# Patient Record
Sex: MALE | Race: WHITE | NOT HISPANIC OR LATINO | Employment: OTHER | URBAN - METROPOLITAN AREA
[De-identification: names, ages, dates, MRNs, and addresses within clinical notes are randomized per-mention and may not be internally consistent; named-entity substitution may affect disease eponyms.]

---

## 2024-08-12 DIAGNOSIS — N32.0 BLADDER NECK CONTRACTURE: Primary | ICD-10-CM

## 2024-08-14 ENCOUNTER — PRE-ADMISSION TESTING (OUTPATIENT)
Dept: PREADMISSION TESTING | Facility: HOSPITAL | Age: 79
End: 2024-08-14
Payer: MEDICARE

## 2024-08-14 VITALS
BODY MASS INDEX: 33.5 KG/M2 | HEART RATE: 64 BPM | RESPIRATION RATE: 16 BRPM | TEMPERATURE: 97.2 F | DIASTOLIC BLOOD PRESSURE: 72 MMHG | OXYGEN SATURATION: 97 % | WEIGHT: 247.36 LBS | SYSTOLIC BLOOD PRESSURE: 146 MMHG | HEIGHT: 72 IN

## 2024-08-14 DIAGNOSIS — R79.1 ABNORMAL COAGULATION PROFILE: ICD-10-CM

## 2024-08-14 DIAGNOSIS — Z01.818 PRE-OP TESTING: ICD-10-CM

## 2024-08-14 LAB
ANION GAP SERPL CALC-SCNC: 13 MMOL/L (ref 10–20)
APTT PPP: 32 SECONDS (ref 27–38)
BUN SERPL-MCNC: 17 MG/DL (ref 6–23)
CALCIUM SERPL-MCNC: 9 MG/DL (ref 8.6–10.3)
CHLORIDE SERPL-SCNC: 108 MMOL/L (ref 98–107)
CO2 SERPL-SCNC: 28 MMOL/L (ref 21–32)
CREAT SERPL-MCNC: 1.04 MG/DL (ref 0.5–1.3)
EGFRCR SERPLBLD CKD-EPI 2021: 73 ML/MIN/1.73M*2
ERYTHROCYTE [DISTWIDTH] IN BLOOD BY AUTOMATED COUNT: 13.3 % (ref 11.5–14.5)
GLUCOSE SERPL-MCNC: 116 MG/DL (ref 74–99)
HCT VFR BLD AUTO: 41.3 % (ref 41–52)
HGB BLD-MCNC: 14.2 G/DL (ref 13.5–17.5)
INR PPP: 1.1 (ref 0.9–1.1)
MCH RBC QN AUTO: 32.2 PG (ref 26–34)
MCHC RBC AUTO-ENTMCNC: 34.4 G/DL (ref 32–36)
MCV RBC AUTO: 94 FL (ref 80–100)
NRBC BLD-RTO: 0 /100 WBCS (ref 0–0)
PLATELET # BLD AUTO: 174 X10*3/UL (ref 150–450)
POTASSIUM SERPL-SCNC: 3.3 MMOL/L (ref 3.5–5.3)
PROTHROMBIN TIME: 12.4 SECONDS (ref 9.8–12.8)
RBC # BLD AUTO: 4.41 X10*6/UL (ref 4.5–5.9)
SODIUM SERPL-SCNC: 146 MMOL/L (ref 136–145)
WBC # BLD AUTO: 5.6 X10*3/UL (ref 4.4–11.3)

## 2024-08-14 PROCEDURE — 80048 BASIC METABOLIC PNL TOTAL CA: CPT

## 2024-08-14 PROCEDURE — 85610 PROTHROMBIN TIME: CPT

## 2024-08-14 PROCEDURE — 99203 OFFICE O/P NEW LOW 30 MIN: CPT | Performed by: NURSE PRACTITIONER

## 2024-08-14 PROCEDURE — 85027 COMPLETE CBC AUTOMATED: CPT

## 2024-08-14 PROCEDURE — 93005 ELECTROCARDIOGRAM TRACING: CPT

## 2024-08-14 PROCEDURE — 36415 COLL VENOUS BLD VENIPUNCTURE: CPT

## 2024-08-14 PROCEDURE — 93010 ELECTROCARDIOGRAM REPORT: CPT | Performed by: INTERNAL MEDICINE

## 2024-08-14 RX ORDER — CARBOXYMETHYLCELLULOSE SODIUM 5 MG/ML
1 SOLUTION/ DROPS OPHTHALMIC AS NEEDED
COMMUNITY

## 2024-08-14 RX ORDER — CALCIUM CARB/VITAMIN D3/VIT K1 500-500-40
TABLET,CHEWABLE ORAL DAILY
COMMUNITY

## 2024-08-14 RX ORDER — LANOLIN ALCOHOL/MO/W.PET/CERES
500 CREAM (GRAM) TOPICAL NIGHTLY
COMMUNITY

## 2024-08-14 RX ORDER — ROSUVASTATIN CALCIUM 10 MG/1
10 TABLET, COATED ORAL NIGHTLY
COMMUNITY

## 2024-08-14 RX ORDER — AMLODIPINE BESYLATE 10 MG/1
TABLET ORAL
COMMUNITY

## 2024-08-14 RX ORDER — ASPIRIN 325 MG
TABLET, DELAYED RELEASE (ENTERIC COATED) ORAL DAILY
COMMUNITY

## 2024-08-14 RX ORDER — OMEPRAZOLE 20 MG/1
20 TABLET, DELAYED RELEASE ORAL
COMMUNITY

## 2024-08-14 RX ORDER — BISMUTH SUBSALICYLATE 262 MG
1 TABLET,CHEWABLE ORAL
COMMUNITY

## 2024-08-14 RX ORDER — VITAMIN E MIXED 400 UNIT
CAPSULE ORAL DAILY
COMMUNITY

## 2024-08-14 RX ORDER — LEVOTHYROXINE SODIUM 100 UG/1
100 TABLET ORAL NIGHTLY
COMMUNITY

## 2024-08-14 RX ORDER — BENAZEPRIL HYDROCHLORIDE 5 MG/1
5 TABLET ORAL NIGHTLY
COMMUNITY

## 2024-08-14 RX ORDER — BUDESONIDE AND FORMOTEROL FUMARATE DIHYDRATE 80; 4.5 UG/1; UG/1
2 AEROSOL RESPIRATORY (INHALATION)
COMMUNITY

## 2024-08-14 RX ORDER — ATENOLOL 50 MG/1
TABLET ORAL
COMMUNITY

## 2024-08-14 RX ORDER — HYDROCHLOROTHIAZIDE 12.5 MG/1
CAPSULE ORAL EVERY MORNING
COMMUNITY

## 2024-08-14 RX ORDER — ALBUTEROL SULFATE 90 UG/1
2 INHALANT RESPIRATORY (INHALATION)
COMMUNITY

## 2024-08-14 ASSESSMENT — DUKE ACTIVITY SCORE INDEX (DASI)
CAN YOU RUN A SHORT DISTANCE: NO
CAN YOU WALK INDOORS, SUCH AS AROUND YOUR HOUSE: YES
CAN YOU WALK A BLOCK OR TWO ON LEVEL GROUND: YES
CAN YOU PARTICIPATE IN STRENOUS SPORTS LIKE SWIMMING, SINGLES TENNIS, FOOTBALL, BASKETBALL, OR SKIING: NO
CAN YOU DO HEAVY WORK AROUND THE HOUSE LIKE SCRUBBING FLOORS OR LIFTING AND MOVING HEAVY FURNITURE: NO
CAN YOU TAKE CARE OF YOURSELF (EAT, DRESS, BATHE, OR USE TOILET): YES
CAN YOU DO LIGHT WORK AROUND THE HOUSE LIKE DUSTING OR WASHING DISHES: YES
CAN YOU DO YARD WORK LIKE RAKING LEAVES, WEEDING OR PUSHING A MOWER: NO
CAN YOU PARTICIPATE IN MODERATE RECREATIONAL ACTIVITIES LIKE GOLF, BOWLING, DANCING, DOUBLES TENNIS OR THROWING A BASEBALL OR FOOTBALL: NO
CAN YOU HAVE SEXUAL RELATIONS: YES
CAN YOU DO MODERATE WORK AROUND THE HOUSE LIKE VACUUMING, SWEEPING FLOORS OR CARRYING GROCERIES: YES

## 2024-08-14 NOTE — CPM/PAT H&P
"CPM/PAT Evaluation       Name: Walt Don (Wlat Don \"Richard\")  /Age: 1945/79 y.o.     In-Person       Chief Complaint:     79 yr old male with urinary difficulties.    He shares that he was DX with prostate cancer   and tx with brachytherapy--> PSA elevated and followed by cryotherapy in   He c/o urination difficulties for years since cryotherapy    He states he presented concerns to Dr. Ash and states he had an ablation in the past for similar issues    He c/o a chronic weak stream that he treats by \"maneuvers his bladder to urinate, takes quite a while  by pulling up on his stomach and stands straighter that helps start the stream.      He c/o urinary frequency  and will have a \"tremendous urgency to pee and pushes or grunts  it out.\" He c/o hesitancy and has difficulty emptying bladder  He c/o leakage of urine, with urge/bending over and also is not always with awareness ---->states \"cannot feel it\" and wears up to 5-6 pads per /day  + enuresis and + nocturia, every 2 hours.    He c/o pain and burning with urination for the last week or so     Denies any bloody urine   Denies any cloudy or foul smelling urine.    Denies any cardiac or respiratory symptoms.  Denies any past issues with anesthesia.      CYSTOSCOPY/ TRANSURETHRAL INCISION/ TRANSURETHRAL RESECTION OF BLADDER NECK CONTRACTURE  is scheduled on 2024 with Dr. Ash      Past Medical History:   Diagnosis Date    Arthritis     Hyperlipidemia     Hypertension     Hypothyroidism     Pancreatic cancer (Multi)     PE (pulmonary thromboembolism) (Multi)     with COVID pneumonia        Acute pulmonary embolism (HCC) 2023    COVID      Deep vein thrombosis (DVT) of lower extremity (HCC) 2023    Gout 2020    Hyperlipidemia 2020    Hypertension      Legionella infection (HCC)      Lumbar spondylosis 2023    Prostate cancer (HCC)        Past Surgical History:   Procedure Laterality Date    " "CATARACT EXTRACTION      COLONOSCOPY      MENISCECTOMY      ROTATOR CUFF REPAIR           ORTHOPEDICS SURGERY HX          bilat. knees    PAST SURGICAL HISTORY OF         B/L rotatar cuff       Patient  has no history on file for sexual activity.    No family history on file.    Allergies   Allergen Reactions    Ciprofloxacin Other     Patient clarifies - not truly allergic, rather - this medication does \"absolutely nothing\" for him       Prior to Admission medications    Medication Sig Start Date End Date Taking? Authorizing Provider   amLODIPine (Norvasc) 10 mg tablet Take by mouth.   Yes Historical Provider, MD   ascorbic acid (Vitamin C) 500 mg ER capsule Take 1 capsule (500 mg) by mouth once daily.   Yes Historical Provider, MD   atenolol (Tenormin) 50 mg tablet Take by mouth.   Yes Historical Provider, MD   benazepril (Lotensin) 5 mg tablet Take 1 tablet (5 mg) by mouth once daily.   Yes Historical Provider, MD   carboxymethylcellulose (Refresh Plus) 0.5 % ophthalmic solution 1 drop if needed for dry eyes.   Yes Historical Provider, MD   cholecalciferol (Vitamin D-3) 400 unit capsule Take by mouth once daily.   Yes Historical Provider, MD   hydroCHLOROthiazide (Microzide) 12.5 mg capsule Take by mouth once daily in the morning.   Yes Historical Provider, MD   levothyroxine (Synthroid, Levoxyl) 100 mcg tablet Take 1 tablet (100 mcg) by mouth. Take on an empty stomach at the same time each day, either 30 to 60 minutes prior to breakfast   Yes Historical Provider, MD   multivitamin tablet Take 1 tablet by mouth.   Yes Historical Provider, MD   omeprazole OTC (PriLOSEC OTC) 20 mg EC tablet Take 1 tablet (20 mg) by mouth. Do not crush, chew, or split.   Yes Historical Provider, MD   rosuvastatin (Crestor) 10 mg tablet Take 1 tablet (10 mg) by mouth.   Yes Historical Provider, MD   albuterol 90 mcg/actuation inhaler Inhale 2 puffs.    Historical Provider, MD   budesonide-formoteroL (Symbicort) 80-4.5 mcg/actuation " inhaler Inhale 2 puffs 2 times a day. Rinse mouth with water after use to reduce aftertaste and incidence of candidiasis. Do not swallow.    Historical Provider, MD   omega-3 (Fish OiL) 60- mg capsule Take by mouth once daily.    Historical Provider, MD      Review of Systems  Constitutional: NO F, chills, or sweats  Eyes: no blurred vision or visual disturbance  ENT: denies congestion, sore throat, difficulty hearing  Cardiovascular: RAMIREZ, no chest pain, no edema, no palps and no syncope.   Respiratory: no cough,no s.o.b. and no wheezing  Gastrointestinal: c/o constipation, last BM 2 days ago, no abdominal pain, no N/V, no blood in stools  Genitourinary:  see HPI  Musculoskeletal: no arthralgias,  no back pain and no myalgias.   Integumentary: no new skin lesions and no rashes.   Neurological: no difficulty walking, no headache, no limb weakness, no numbness and no tingling.   Endocrine: no recent weight gain and no recent weight loss.   Hematologic/Lymphatic: + tendency for easy bruising and no swollen glands.      Physical Exam  Constitutional:       Appearance: Normal appearance.   Cardiovascular:      Rate and Rhythm: Normal rate.      Heart sounds: Normal heart sounds.   Pulmonary:      Effort: Pulmonary effort is normal.      Breath sounds: Normal breath sounds.   Abdominal:      General: Bowel sounds are normal.      Palpations: Abdomen is soft.   Musculoskeletal:         General: Normal range of motion.      Cervical back: Normal range of motion.      Right lower leg: No edema.      Left lower leg: No edema.   Skin:     General: Skin is warm and dry.   Neurological:      Mental Status: He is alert and oriented to person, place, and time.          PAT AIRWAY:   Airway:     Mallampati::  II    TM distance::  >3 FB    Neck ROM::  Full   implants and upper dentures      Visit Vitals  /72   Pulse 64   Temp 36.2 °C (97.2 °F) (Temporal)   Resp 16       DASI Risk Score    No data to display       Caprini  DVT Assessment    No data to display       Modified Frailty Index    No data to display       CHADS2 Stroke Risk  Current as of 42 minutes ago        N/A 3 to 100%: High Risk   2 to < 3%: Medium Risk   0 to < 2%: Low Risk     Last Change: N/A          This score determines the patient's risk of having a stroke if the patient has atrial fibrillation.        This score is not applicable to this patient. Components are not calculated.          Revised Cardiac Risk Index    No data to display       Apfel Simplified Score    No data to display       Risk Analysis Index Results This Encounter    No data found in the last 1 encounters.       Stop Bang Score      Flowsheet Row Most Recent Value   Do you snore loudly? 1   Do you often feel tired or fatigued after your sleep? 0   Has anyone ever observed you stop breathing in your sleep? 0   Do you have or are you being treated for high blood pressure? 1   Recent BMI (Calculated) 0   Age older than 50 years old? 1=Yes   Is your neck circumference greater than 17 inches (Male) or 16 inches (Female)? 0   Gender - Male 1=Yes            ASSESSMENT  Obesity BMI 33.55      HLD/HTN  Takes Atenolol, amlodipine, benzpril, fish oil, hydrochlorothiazide, Rosuvastatin  ECG 8/14/2024- Sinus rhythm 1st degree AVB, 67 bpm      COVID pneumonia  Follows pulmonology  Has inhalers PRN    CXR/2 view- 8/13/2023  IMPRESSION:   No acute cardiopulmonary process.        ANESTHESIA FINDINGS:  Intubation History: No history of difficult intubation  Significant Anesthesia Considerations:      Airway History: No abnormal airway history  Predictors of Difficult Airway Management  ? Obesity         PLAN  This patient is optimally prepared for surgery.     CONSULTS:    Patient does not require consults for optimization at this time.     The Following Tests/Procedures Have Been Initiated:  CBC, BMP, Coag screen,  ECG     Planned Anesthetic: general     Instructions Given to Patient:  Patient given verbal and  written preop instructions and voices comprehension and compliance.     No further testing required.

## 2024-08-14 NOTE — PREPROCEDURE INSTRUCTIONS
Medication List            Accurate as of August 14, 2024  2:28 PM. Always use your most recent med list.                albuterol 90 mcg/actuation inhaler  Medication Adjustments for Surgery: Other (Comment)     amLODIPine 10 mg tablet  Commonly known as: Norvasc  Medication Adjustments for Surgery: Take morning of surgery with sip of water, no other fluids     ascorbic acid 500 mg ER capsule  Commonly known as: Vitamin C  Medication Adjustments for Surgery: Stop 7 days before surgery     atenolol 50 mg tablet  Commonly known as: Tenormin  Medication Adjustments for Surgery: Take morning of surgery with sip of water, no other fluids     benazepril 5 mg tablet  Commonly known as: Lotensin  Medication Adjustments for Surgery: Stop 1 day before surgery  Notes to patient: DO not take the night before surgery, 8/18     budesonide-formoteroL 80-4.5 mcg/actuation inhaler  Commonly known as: Symbicort     carboxymethylcellulose 0.5 % ophthalmic solution  Commonly known as: Refresh Plus     cholecalciferol 400 unit capsule  Commonly known as: Vitamin D-3     Fish OiL 60- mg capsule  Generic drug: omega-3     hydroCHLOROthiazide 12.5 mg capsule  Commonly known as: Microzide     levothyroxine 100 mcg tablet  Commonly known as: Synthroid, Levoxyl     multivitamin tablet     omeprazole OTC 20 mg EC tablet  Commonly known as: PriLOSEC OTC     rosuvastatin 10 mg tablet  Commonly known as: Crestor     vitamin E 450 mg (1000 unit) capsule            PRE-OPERATIVE INSTRUCTIONS FOR SURGERY    *Do not eat anything after midnight the night of surgery.  This includes food of any kind (including hard candy, cough drops, mints).   You may have up to 13.5 ounces of clear liquid  until TWO hours prior to your arrival time to the hospital.  This includes water, black tea/coffee, (no milk or cream) apple juice and electrolyte drinks (GATORADE).  You may chew gum until TWO hours prior you your surgery/procedure.     PLEASE REVIEW YOUR  MEDICATION LIST so as to identify which medications should be stopped/held prior to your procedure, as well what medications must be taken the day of your surgery with sips of clear liquid.      -------------------------------------------------      *One of our staff members will call you ONE business day before your surgery, between 11am-2 pm to let you know the time to arrive  .   If you have not received a call by 2 pm, call [phone]    *When you arrive at the hospital-->GO TO Registration on the ground floor    *Stop smoking 24 hours prior to surgery.      No Marijuana, CBD Oil or Vaping for 48  Hours    *No alcohol 24 hours prior to surgery    *You will need a responsible adult to drive you home    -No acrylic nails or nail polish on at least one fingernail, NO polish on toes for foot surgery    -You may be asked to remove your dentures, partial plate, eyeglasses or contact lenses before going to surgery.  Please bring a case for these items.    Take a shower the morning of surgery so as to remove all body residue from applied lotions, powders, cologne, or deodorant.  DO NOT REAPPLY ANY OF THESE ITEMS AFTER BATHING.    -Body piercings need to be removed.  Jewelry and valuables should be left at home.    -Put on loose,  comfortable, clean clothing, that will accommodate bandages    -------------------------------------------------      What you may be asked to bring to surgery:  ___PHOTO ID and insurance information      -------------------------------------------------  NPO Instructions:    Do not eat any food after midnight the night before your surgery/procedure.  You may have clear liquids until TWO hours before surgery/procedure. This includes water, black tea/coffee, (no milk or cream) apple juice and electrolyte drinks (Gatorade).  You may chew gum up to TWO hours before your surgery/procedure.    Additional Instructions:       Day of Surgery:  Review your medication instructions, take indicated  medications  You may have clear liquids until TWO hours before surgery/procedure.  This includes water, black tea/coffee, (no milk or cream) apple juice and electrolyte drinks (Gatorade)  You may chew gum up to TWO hours before your surgery/procedure  Wear  comfortable loose fitting clothing  Do not use moisturizers, creams, lotions or perfume  All jewelry and valuables should be left at home      ------------------------------------------------

## 2024-08-14 NOTE — H&P (VIEW-ONLY)
"CPM/PAT Evaluation       Name: Walt Don (Walt Don \"Richard\")  /Age: 1945/79 y.o.     In-Person       Chief Complaint:     79 yr old male with urinary difficulties.    He shares that he was DX with prostate cancer   and tx with brachytherapy--> PSA elevated and followed by cryotherapy in   He c/o urination difficulties for years since cryotherapy    He states he presented concerns to Dr. Ash and states he had an ablation in the past for similar issues    He c/o a chronic weak stream that he treats by \"maneuvers his bladder to urinate, takes quite a while  by pulling up on his stomach and stands straighter that helps start the stream.      He c/o urinary frequency  and will have a \"tremendous urgency to pee and pushes or grunts  it out.\" He c/o hesitancy and has difficulty emptying bladder  He c/o leakage of urine, with urge/bending over and also is not always with awareness ---->states \"cannot feel it\" and wears up to 5-6 pads per /day  + enuresis and + nocturia, every 2 hours.    He c/o pain and burning with urination for the last week or so     Denies any bloody urine   Denies any cloudy or foul smelling urine.    Denies any cardiac or respiratory symptoms.  Denies any past issues with anesthesia.      CYSTOSCOPY/ TRANSURETHRAL INCISION/ TRANSURETHRAL RESECTION OF BLADDER NECK CONTRACTURE  is scheduled on 2024 with Dr. Ash      Past Medical History:   Diagnosis Date    Arthritis     Hyperlipidemia     Hypertension     Hypothyroidism     Pancreatic cancer (Multi)     PE (pulmonary thromboembolism) (Multi)     with COVID pneumonia        Acute pulmonary embolism (HCC) 2023    COVID      Deep vein thrombosis (DVT) of lower extremity (HCC) 2023    Gout 2020    Hyperlipidemia 2020    Hypertension      Legionella infection (HCC)      Lumbar spondylosis 2023    Prostate cancer (HCC)        Past Surgical History:   Procedure Laterality Date    " "CATARACT EXTRACTION      COLONOSCOPY      MENISCECTOMY      ROTATOR CUFF REPAIR           ORTHOPEDICS SURGERY HX          bilat. knees    PAST SURGICAL HISTORY OF         B/L rotatar cuff       Patient  has no history on file for sexual activity.    No family history on file.    Allergies   Allergen Reactions    Ciprofloxacin Other     Patient clarifies - not truly allergic, rather - this medication does \"absolutely nothing\" for him       Prior to Admission medications    Medication Sig Start Date End Date Taking? Authorizing Provider   amLODIPine (Norvasc) 10 mg tablet Take by mouth.   Yes Historical Provider, MD   ascorbic acid (Vitamin C) 500 mg ER capsule Take 1 capsule (500 mg) by mouth once daily.   Yes Historical Provider, MD   atenolol (Tenormin) 50 mg tablet Take by mouth.   Yes Historical Provider, MD   benazepril (Lotensin) 5 mg tablet Take 1 tablet (5 mg) by mouth once daily.   Yes Historical Provider, MD   carboxymethylcellulose (Refresh Plus) 0.5 % ophthalmic solution 1 drop if needed for dry eyes.   Yes Historical Provider, MD   cholecalciferol (Vitamin D-3) 400 unit capsule Take by mouth once daily.   Yes Historical Provider, MD   hydroCHLOROthiazide (Microzide) 12.5 mg capsule Take by mouth once daily in the morning.   Yes Historical Provider, MD   levothyroxine (Synthroid, Levoxyl) 100 mcg tablet Take 1 tablet (100 mcg) by mouth. Take on an empty stomach at the same time each day, either 30 to 60 minutes prior to breakfast   Yes Historical Provider, MD   multivitamin tablet Take 1 tablet by mouth.   Yes Historical Provider, MD   omeprazole OTC (PriLOSEC OTC) 20 mg EC tablet Take 1 tablet (20 mg) by mouth. Do not crush, chew, or split.   Yes Historical Provider, MD   rosuvastatin (Crestor) 10 mg tablet Take 1 tablet (10 mg) by mouth.   Yes Historical Provider, MD   albuterol 90 mcg/actuation inhaler Inhale 2 puffs.    Historical Provider, MD   budesonide-formoteroL (Symbicort) 80-4.5 mcg/actuation " inhaler Inhale 2 puffs 2 times a day. Rinse mouth with water after use to reduce aftertaste and incidence of candidiasis. Do not swallow.    Historical Provider, MD   omega-3 (Fish OiL) 60- mg capsule Take by mouth once daily.    Historical Provider, MD      Review of Systems  Constitutional: NO F, chills, or sweats  Eyes: no blurred vision or visual disturbance  ENT: denies congestion, sore throat, difficulty hearing  Cardiovascular: RAMIREZ, no chest pain, no edema, no palps and no syncope.   Respiratory: no cough,no s.o.b. and no wheezing  Gastrointestinal: c/o constipation, last BM 2 days ago, no abdominal pain, no N/V, no blood in stools  Genitourinary:  see HPI  Musculoskeletal: no arthralgias,  no back pain and no myalgias.   Integumentary: no new skin lesions and no rashes.   Neurological: no difficulty walking, no headache, no limb weakness, no numbness and no tingling.   Endocrine: no recent weight gain and no recent weight loss.   Hematologic/Lymphatic: + tendency for easy bruising and no swollen glands.      Physical Exam  Constitutional:       Appearance: Normal appearance.   Cardiovascular:      Rate and Rhythm: Normal rate.      Heart sounds: Normal heart sounds.   Pulmonary:      Effort: Pulmonary effort is normal.      Breath sounds: Normal breath sounds.   Abdominal:      General: Bowel sounds are normal.      Palpations: Abdomen is soft.   Musculoskeletal:         General: Normal range of motion.      Cervical back: Normal range of motion.      Right lower leg: No edema.      Left lower leg: No edema.   Skin:     General: Skin is warm and dry.   Neurological:      Mental Status: He is alert and oriented to person, place, and time.          PAT AIRWAY:   Airway:     Mallampati::  II    TM distance::  >3 FB    Neck ROM::  Full   implants and upper dentures      Visit Vitals  /72   Pulse 64   Temp 36.2 °C (97.2 °F) (Temporal)   Resp 16       DASI Risk Score    No data to display       Caprini  DVT Assessment    No data to display       Modified Frailty Index    No data to display       CHADS2 Stroke Risk  Current as of 42 minutes ago        N/A 3 to 100%: High Risk   2 to < 3%: Medium Risk   0 to < 2%: Low Risk     Last Change: N/A          This score determines the patient's risk of having a stroke if the patient has atrial fibrillation.        This score is not applicable to this patient. Components are not calculated.          Revised Cardiac Risk Index    No data to display       Apfel Simplified Score    No data to display       Risk Analysis Index Results This Encounter    No data found in the last 1 encounters.       Stop Bang Score      Flowsheet Row Most Recent Value   Do you snore loudly? 1   Do you often feel tired or fatigued after your sleep? 0   Has anyone ever observed you stop breathing in your sleep? 0   Do you have or are you being treated for high blood pressure? 1   Recent BMI (Calculated) 0   Age older than 50 years old? 1=Yes   Is your neck circumference greater than 17 inches (Male) or 16 inches (Female)? 0   Gender - Male 1=Yes            ASSESSMENT  Obesity BMI 33.55      HLD/HTN  Takes Atenolol, amlodipine, benzpril, fish oil, hydrochlorothiazide, Rosuvastatin  ECG 8/14/2024- Sinus rhythm 1st degree AVB, 67 bpm      COVID pneumonia  Follows pulmonology  Has inhalers PRN    CXR/2 view- 8/13/2023  IMPRESSION:   No acute cardiopulmonary process.        ANESTHESIA FINDINGS:  Intubation History: No history of difficult intubation  Significant Anesthesia Considerations:      Airway History: No abnormal airway history  Predictors of Difficult Airway Management  ? Obesity         PLAN  This patient is optimally prepared for surgery.     CONSULTS:    Patient does not require consults for optimization at this time.     The Following Tests/Procedures Have Been Initiated:  CBC, BMP, Coag screen,  ECG     Planned Anesthetic: general     Instructions Given to Patient:  Patient given verbal and  written preop instructions and voices comprehension and compliance.     No further testing required.

## 2024-08-15 ENCOUNTER — TELEPHONE (OUTPATIENT)
Dept: PREADMISSION TESTING | Facility: HOSPITAL | Age: 79
End: 2024-08-15
Payer: MEDICARE

## 2024-08-15 DIAGNOSIS — N32.0 BLADDER NECK CONTRACTURE: Primary | ICD-10-CM

## 2024-08-15 DIAGNOSIS — Z01.818 PRE-OP TESTING: ICD-10-CM

## 2024-08-15 NOTE — TELEPHONE ENCOUNTER
Called patient re his PAT visit on 8/14/2024   BMP resulted with K+ 3.3  Instructed patient to contact PCP re supplementation of potassium prior to surgery / TURP scheduled on 8/19.     Patient and wife verbalized understanding    Plan-repeat K+ on day of surgery

## 2024-08-17 ENCOUNTER — ANESTHESIA EVENT (OUTPATIENT)
Dept: OPERATING ROOM | Facility: HOSPITAL | Age: 79
End: 2024-08-17
Payer: MEDICARE

## 2024-08-17 PROBLEM — E03.9 HYPOTHYROIDISM: Status: ACTIVE | Noted: 2023-03-21

## 2024-08-17 PROBLEM — I26.99 ACUTE PULMONARY EMBOLISM (MULTI): Status: ACTIVE | Noted: 2023-03-21

## 2024-08-17 PROBLEM — C61 CARCINOMA OF PROSTATE (MULTI): Status: ACTIVE | Noted: 2024-08-17

## 2024-08-17 PROBLEM — I10 BENIGN ESSENTIAL HYPERTENSION: Status: ACTIVE | Noted: 2024-08-17

## 2024-08-17 LAB
ATRIAL RATE: 67 BPM
P OFFSET: 155 MS
P ONSET: 99 MS
PR INTERVAL: 246 MS
Q ONSET: 222 MS
QRS COUNT: 11 BEATS
QRS DURATION: 98 MS
QT INTERVAL: 436 MS
QTC CALCULATION(BAZETT): 460 MS
QTC FREDERICIA: 452 MS
R AXIS: -30 DEGREES
T AXIS: 32 DEGREES
T OFFSET: 440 MS
VENTRICULAR RATE: 67 BPM

## 2024-08-18 NOTE — ANESTHESIA PREPROCEDURE EVALUATION
"Patient: Walt Don \"Richard\"    Procedure Information       Date/Time: 08/19/24 6161    Procedure: CYSTOSCOPY/ TRANSURETHRAL INCISION & RESECTION OF BLADDER NECK CONTRACTURE - cysto, transurethral incision, transurethral resction of bladder neck contracture    Location: [facility] / [facility]    Surgeons: Miguelangel Ash MD            Relevant Problems   Cardiac   (+) Benign essential hypertension   (+) Hyperlipidemia      Pulmonary   (+) Acute pulmonary embolism (Multi)      /Renal   (+) Carcinoma of prostate (Multi)      Endocrine   (+) Hypothyroidism       Clinical information reviewed:      Problems              NPO Detail:  No data recorded     Physical Exam    Airway  Mallampati: II  TM distance: >3 FB  Neck ROM: full     Cardiovascular - normal exam  Rhythm: regular  Rate: normal     Dental - normal exam  (+) upper dentures, lower dentures     Pulmonary - normal exam     Abdominal            Anesthesia Plan    History of general anesthesia?: yes  History of complications of general anesthesia?: no    ASA 3     general     The patient is not a current smoker.    intravenous induction   Postoperative administration of opioids is intended.  Trial extubation is planned.  Anesthetic plan and risks discussed with patient.  Use of blood products discussed with patient who.    Plan discussed with [facility].      "

## 2024-08-19 ENCOUNTER — ANESTHESIA (OUTPATIENT)
Dept: OPERATING ROOM | Facility: HOSPITAL | Age: 79
End: 2024-08-19
Payer: MEDICARE

## 2024-08-19 ENCOUNTER — HOSPITAL ENCOUNTER (OUTPATIENT)
Facility: HOSPITAL | Age: 79
Setting detail: OUTPATIENT SURGERY
Discharge: HOME | End: 2024-08-19
Attending: UROLOGY | Admitting: UROLOGY
Payer: MEDICARE

## 2024-08-19 VITALS
TEMPERATURE: 96.8 F | RESPIRATION RATE: 16 BRPM | WEIGHT: 247.36 LBS | OXYGEN SATURATION: 98 % | BODY MASS INDEX: 33.5 KG/M2 | SYSTOLIC BLOOD PRESSURE: 167 MMHG | HEIGHT: 72 IN | HEART RATE: 53 BPM | DIASTOLIC BLOOD PRESSURE: 79 MMHG

## 2024-08-19 DIAGNOSIS — R79.1 ABNORMAL COAGULATION PROFILE: ICD-10-CM

## 2024-08-19 DIAGNOSIS — Z01.818 PRE-OP TESTING: Primary | ICD-10-CM

## 2024-08-19 DIAGNOSIS — N32.0 BLADDER NECK CONTRACTURE: ICD-10-CM

## 2024-08-19 PROBLEM — E78.5 HYPERLIPIDEMIA: Status: ACTIVE | Noted: 2020-09-03

## 2024-08-19 PROBLEM — M72.0 DUPUYTREN'S CONTRACTURE OF BOTH HANDS: Status: ACTIVE | Noted: 2023-03-21

## 2024-08-19 LAB — POTASSIUM SERPL-SCNC: 3.2 MMOL/L (ref 3.5–5.3)

## 2024-08-19 PROCEDURE — 2500000004 HC RX 250 GENERAL PHARMACY W/ HCPCS (ALT 636 FOR OP/ED): Performed by: ANESTHESIOLOGIST ASSISTANT

## 2024-08-19 PROCEDURE — 2500000005 HC RX 250 GENERAL PHARMACY W/O HCPCS: Performed by: ANESTHESIOLOGIST ASSISTANT

## 2024-08-19 PROCEDURE — 99100 ANES PT EXTEME AGE<1 YR&>70: CPT | Performed by: ANESTHESIOLOGY

## 2024-08-19 PROCEDURE — 3600000008 HC OR TIME - EACH INCREMENTAL 1 MINUTE - PROCEDURE LEVEL THREE: Performed by: UROLOGY

## 2024-08-19 PROCEDURE — 3600000003 HC OR TIME - INITIAL BASE CHARGE - PROCEDURE LEVEL THREE: Performed by: UROLOGY

## 2024-08-19 PROCEDURE — C1769 GUIDE WIRE: HCPCS | Performed by: UROLOGY

## 2024-08-19 PROCEDURE — 2500000005 HC RX 250 GENERAL PHARMACY W/O HCPCS: Performed by: UROLOGY

## 2024-08-19 PROCEDURE — 2720000007 HC OR 272 NO HCPCS: Performed by: UROLOGY

## 2024-08-19 PROCEDURE — 84132 ASSAY OF SERUM POTASSIUM: CPT | Performed by: NURSE PRACTITIONER

## 2024-08-19 PROCEDURE — 88307 TISSUE EXAM BY PATHOLOGIST: CPT | Mod: TC,PARLAB

## 2024-08-19 PROCEDURE — 36415 COLL VENOUS BLD VENIPUNCTURE: CPT | Performed by: NURSE PRACTITIONER

## 2024-08-19 PROCEDURE — 7100000002 HC RECOVERY ROOM TIME - EACH INCREMENTAL 1 MINUTE: Performed by: UROLOGY

## 2024-08-19 PROCEDURE — A52224 PR CYSTOURETHROSCOPY,FULGUR <0.5 CM LESN: Performed by: ANESTHESIOLOGY

## 2024-08-19 PROCEDURE — 7100000010 HC PHASE TWO TIME - EACH INCREMENTAL 1 MINUTE: Performed by: UROLOGY

## 2024-08-19 PROCEDURE — 3700000002 HC GENERAL ANESTHESIA TIME - EACH INCREMENTAL 1 MINUTE: Performed by: UROLOGY

## 2024-08-19 PROCEDURE — 7100000001 HC RECOVERY ROOM TIME - INITIAL BASE CHARGE: Performed by: UROLOGY

## 2024-08-19 PROCEDURE — 7100000009 HC PHASE TWO TIME - INITIAL BASE CHARGE: Performed by: UROLOGY

## 2024-08-19 PROCEDURE — 3700000001 HC GENERAL ANESTHESIA TIME - INITIAL BASE CHARGE: Performed by: UROLOGY

## 2024-08-19 PROCEDURE — A52224 PR CYSTOURETHROSCOPY,FULGUR <0.5 CM LESN: Performed by: ANESTHESIOLOGIST ASSISTANT

## 2024-08-19 RX ORDER — PROPOFOL 10 MG/ML
INJECTION, EMULSION INTRAVENOUS AS NEEDED
Status: DISCONTINUED | OUTPATIENT
Start: 2024-08-19 | End: 2024-08-19

## 2024-08-19 RX ORDER — PROCHLORPERAZINE EDISYLATE 5 MG/ML
5 INJECTION INTRAMUSCULAR; INTRAVENOUS ONCE AS NEEDED
Status: DISCONTINUED | OUTPATIENT
Start: 2024-08-19 | End: 2024-08-19 | Stop reason: HOSPADM

## 2024-08-19 RX ORDER — ONDANSETRON HYDROCHLORIDE 2 MG/ML
INJECTION, SOLUTION INTRAVENOUS AS NEEDED
Status: DISCONTINUED | OUTPATIENT
Start: 2024-08-19 | End: 2024-08-19

## 2024-08-19 RX ORDER — SODIUM CHLORIDE 0.9 G/100ML
IRRIGANT IRRIGATION AS NEEDED
Status: DISCONTINUED | OUTPATIENT
Start: 2024-08-19 | End: 2024-08-19 | Stop reason: HOSPADM

## 2024-08-19 RX ORDER — ONDANSETRON HYDROCHLORIDE 2 MG/ML
4 INJECTION, SOLUTION INTRAVENOUS ONCE AS NEEDED
Status: DISCONTINUED | OUTPATIENT
Start: 2024-08-19 | End: 2024-08-19 | Stop reason: HOSPADM

## 2024-08-19 RX ORDER — FENTANYL CITRATE 50 UG/ML
INJECTION, SOLUTION INTRAMUSCULAR; INTRAVENOUS AS NEEDED
Status: DISCONTINUED | OUTPATIENT
Start: 2024-08-19 | End: 2024-08-19

## 2024-08-19 RX ORDER — SODIUM CHLORIDE, SODIUM LACTATE, POTASSIUM CHLORIDE, CALCIUM CHLORIDE 600; 310; 30; 20 MG/100ML; MG/100ML; MG/100ML; MG/100ML
100 INJECTION, SOLUTION INTRAVENOUS CONTINUOUS
Status: DISCONTINUED | OUTPATIENT
Start: 2024-08-19 | End: 2024-08-19 | Stop reason: HOSPADM

## 2024-08-19 RX ORDER — MEPERIDINE HYDROCHLORIDE 25 MG/ML
12.5 INJECTION INTRAMUSCULAR; INTRAVENOUS; SUBCUTANEOUS EVERY 10 MIN PRN
Status: DISCONTINUED | OUTPATIENT
Start: 2024-08-19 | End: 2024-08-19 | Stop reason: HOSPADM

## 2024-08-19 RX ORDER — ACETAMINOPHEN 325 MG/1
650 TABLET ORAL EVERY 4 HOURS PRN
Status: DISCONTINUED | OUTPATIENT
Start: 2024-08-19 | End: 2024-08-19 | Stop reason: HOSPADM

## 2024-08-19 RX ORDER — LIDOCAINE HCL/PF 100 MG/5ML
SYRINGE (ML) INTRAVENOUS AS NEEDED
Status: DISCONTINUED | OUTPATIENT
Start: 2024-08-19 | End: 2024-08-19

## 2024-08-19 RX ORDER — IPRATROPIUM BROMIDE 0.5 MG/2.5ML
500 SOLUTION RESPIRATORY (INHALATION) ONCE
Status: DISCONTINUED | OUTPATIENT
Start: 2024-08-19 | End: 2024-08-19 | Stop reason: HOSPADM

## 2024-08-19 RX ORDER — CEFAZOLIN 1 G/1
INJECTION, POWDER, FOR SOLUTION INTRAVENOUS AS NEEDED
Status: DISCONTINUED | OUTPATIENT
Start: 2024-08-19 | End: 2024-08-19

## 2024-08-19 RX ORDER — LIDOCAINE HYDROCHLORIDE 10 MG/ML
0.1 INJECTION INFILTRATION; PERINEURAL ONCE
Status: DISCONTINUED | OUTPATIENT
Start: 2024-08-19 | End: 2024-08-19 | Stop reason: HOSPADM

## 2024-08-19 RX ORDER — ALBUTEROL SULFATE 0.83 MG/ML
2.5 SOLUTION RESPIRATORY (INHALATION) ONCE AS NEEDED
Status: DISCONTINUED | OUTPATIENT
Start: 2024-08-19 | End: 2024-08-19 | Stop reason: HOSPADM

## 2024-08-19 SDOH — HEALTH STABILITY: MENTAL HEALTH: CURRENT SMOKER: 0

## 2024-08-19 ASSESSMENT — PAIN SCALES - GENERAL
PAINLEVEL_OUTOF10: 0 - NO PAIN
PAINLEVEL_OUTOF10: 2
PAINLEVEL_OUTOF10: 0 - NO PAIN
PAIN_LEVEL: 0

## 2024-08-19 ASSESSMENT — PAIN - FUNCTIONAL ASSESSMENT
PAIN_FUNCTIONAL_ASSESSMENT: 0-10

## 2024-08-19 ASSESSMENT — COLUMBIA-SUICIDE SEVERITY RATING SCALE - C-SSRS
2. HAVE YOU ACTUALLY HAD ANY THOUGHTS OF KILLING YOURSELF?: NO
6. HAVE YOU EVER DONE ANYTHING, STARTED TO DO ANYTHING, OR PREPARED TO DO ANYTHING TO END YOUR LIFE?: NO
1. IN THE PAST MONTH, HAVE YOU WISHED YOU WERE DEAD OR WISHED YOU COULD GO TO SLEEP AND NOT WAKE UP?: NO

## 2024-08-19 ASSESSMENT — PAIN DESCRIPTION - DESCRIPTORS: DESCRIPTORS: DULL;ACHING

## 2024-08-19 NOTE — ANESTHESIA PROCEDURE NOTES
Airway  Date/Time: 8/19/2024 7:46 AM  Urgency: elective    Airway not difficult    Staffing  Performed: DONTAE   Authorized by: Jesus Martin MD    Performed by: DONTAE Lopes  Patient location during procedure: OR    Indications and Patient Condition  Indications for airway management: anesthesia  Spontaneous Ventilation: absent  Sedation level: deep  Preoxygenated: yes  Patient position: sniffing  MILS maintained throughout  Mask difficulty assessment: 0 - not attempted  Planned trial extubation    Final Airway Details  Final airway type: supraglottic airway      Successful airway: Supraglottic airway: I Gel.  Size 5     Number of attempts at approach: 1  Number of other approaches attempted: 0    Additional Comments  Easy LMA insertion.

## 2024-08-19 NOTE — ANESTHESIA POSTPROCEDURE EVALUATION
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.0])    Surgeons: Miguelangel Ash MD Responsible Provider: Jesus Martin MD    Anesthesia Type: general ASA Status: 3            Anesthesia Type: general    Vitals Value Taken Time   /74 08/19/24 0851   Temp 36 °C (96.8 °F) 08/19/24 0851   Pulse 57 08/19/24 0851   Resp 16 08/19/24 0851   SpO2 98 % 08/19/24 0851       Anesthesia Post Evaluation    Patient location during evaluation: PACU  Patient participation: complete - patient participated  Level of consciousness: awake  Pain score: 0  Pain management: adequate  Airway patency: patent  Cardiovascular status: acceptable  Respiratory status: acceptable  Hydration status: acceptable  Postoperative Nausea and Vomiting: none        There were no known notable events for this encounter.    "

## 2024-08-19 NOTE — OP NOTE
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]     Procedures  CYSTOSCOPY/ TRANSURETHRAL INCISION & RESECTION OF BLADDER NECK CONTRACTURE  69971 - AR CYSTO W/REMOVAL OF LESIONS SMALL      Surgeons      * Miguelangel Ash - Primary    Resident/Fellow/Other Assistant:  Surgeons and Role:  * No surgeons found with a matching role *    Procedure Summary  Anesthesia: General  ASA: III  Anesthesia Staff: Anesthesiologist: Jesus Martin MD  C-AA: DONTAE Lopes  Estimated Blood Loss: 0mL  Intra-op Medications:   Administrations occurring from 0730 to 0900 on 24:   Medication Name Total Dose   sodium chloride 0.9 % irrigation solution 3,000 mL              Anesthesia Record               Intraprocedure I/O Totals       None           Specimen:   ID Type Source Tests Collected by Time   1 : BLADDER NECK TISSUE Tissue BLADDER TRANSURETHRAL RESECTION SURGICAL PATHOLOGY EXAM Miguelangel Ash MD 2024 0804        Staff:   Circulator: Milli  Scrub Person: Kacey         Drains and/or Catheters:   Urethral Catheter Non-latex 24 Fr. (Active)       Tourniquet Times:         Implants:     Findings: Dense bladder neck contrature    Indications: Richard Don is an 79 y.o. 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 And retention    The patient was seen in the preoperative area. The risks, benefits, complications, treatment options, non-operative alternatives, expected recovery and outcomes were discussed with the patient. The possibilities of reaction to medication, pulmonary aspiration, injury to surrounding structures, bleeding, recurrent infection, the need for additional procedures, " failure to diagnose a condition, and creating a complication requiring transfusion or operation were discussed with the patient. The patient concurred with the proposed plan, giving informed consent.  The site of surgery was properly noted/marked if necessary per policy. The patient has been actively warmed in preoperative area. Preoperative antibiotics have been ordered and given within 1 hours of incision. Venous thrombosis prophylaxis have been ordered including bilateral sequential compression devices    Procedure Details: After induction of general anesthesia, the genitalia were prepped and draped.  A 22 Fr cystoscope was inserted.  I was unable to pass a guide wire through the BNC and into the bladder.  A visual urethrotome was inserted.  The blade was advanced and the BN was incised at the 12 o'clock position until I was able to enter the bladder.  A 26 Fr resectoscope was then passed and the BN scar tissue was resected.  Inside the bladder, several dystrophic calcifications were found.  A Lowsely Lithotrite was used to crush them and fragments were aspirated using a Kayden syringe.  Two brachytherapy seeds were also extracted.  A 24 Fr. Silicone catheter was then placed to closed drainage.  Complications:  None; patient tolerated the procedure well.    Disposition: PACU - hemodynamically stable.  Condition: stable         Additional Details: tissue from BN sent to pathology    Attending Attestation: I performed the procedure.    Miguelangel Ash  Phone Number: [phone]

## 2024-08-28 LAB
LABORATORY COMMENT REPORT: NORMAL
PATH REPORT.FINAL DX SPEC: NORMAL
PATH REPORT.GROSS SPEC: NORMAL
PATH REPORT.RELEVANT HX SPEC: NORMAL
PATH REPORT.TOTAL CANCER: NORMAL

## (undated) DEVICE — EVACUATOR, BLADDER, ELLIK, PLASTIC

## (undated) DEVICE — IRRIGATION SET, CYSTOSCOPY, TURP, Y, CONTINUOUS, 81 IN

## (undated) DEVICE — TUBING, CLEAR N-COND, 5MM X 10, LF

## (undated) DEVICE — SYRINGE, 10 CC, SLIP TIP

## (undated) DEVICE — Device

## (undated) DEVICE — GUIDEWIRE, STRAIGHT TIP, .038 DIA, 150 CM, 3 CM TIP"

## (undated) DEVICE — BAG, DRAINAGE, URINARY, W/MONO-FLO ANTI-REFLUX DEVICE, NEEDLESS SAMPLING PORT,SPLASHGUARD II/SAFEGUARD, 2000 CC, STERILE, LF

## (undated) DEVICE — COLLECTION BAG, FLUID, NON-STERILE

## (undated) DEVICE — SYRINGE, 60 CC, IRRIGATION, PISTON, CATH TIP, W/LUER ADAPTER,DISP